# Patient Record
Sex: FEMALE
[De-identification: names, ages, dates, MRNs, and addresses within clinical notes are randomized per-mention and may not be internally consistent; named-entity substitution may affect disease eponyms.]

---

## 2018-08-14 ENCOUNTER — HOSPITAL ENCOUNTER (EMERGENCY)
Dept: HOSPITAL 56 - MW.ED | Age: 43
Discharge: HOME | End: 2018-08-14
Payer: COMMERCIAL

## 2018-08-14 DIAGNOSIS — R60.0: Primary | ICD-10-CM

## 2018-08-14 DIAGNOSIS — Z86.718: ICD-10-CM

## 2018-08-14 LAB
CHLORIDE SERPL-SCNC: 105 MMOL/L (ref 98–107)
SODIUM SERPL-SCNC: 140 MMOL/L (ref 136–145)

## 2018-08-14 NOTE — EDM.PDOC
ED HPI GENERAL MEDICAL PROBLEM





- General


Chief Complaint: Lower Extremity Injury/Pain


Stated Complaint: BOTH FEET ARE SWOLLEN


Time Seen by Provider: 08/14/18 20:38





- History of Present Illness


INITIAL COMMENTS - FREE TEXT/NARRATIVE: 


HISTORY AND PHYSICAL:





History of present illness:


Patient's 42-year-old female presented concern of lower extremity edema she 

states she's been on several long drives recently has also had one episode in 

the past after having been on her feet for a very long time she denies chest 

pain shortness of breath and history of DVT is a very active healthy woman 

otherwise. No fever chills no other complaints





Review of systems: 


As per history of present illness and below otherwise all systems reviewed and 

negative.





Past medical history: 


As per history of present illness and as reviewed below otherwise 

noncontributory.





Surgical history: 


As per history of present illness and as reviewed below otherwise 

noncontributory.





Social history: 


No reported history of drug or alcohol abuse.





Family history: 


As per history of present illness and as reviewed below otherwise 

noncontributory.





Physical exam:


HEENT: Atraumatic, normocephalic, pupils reactive, negative for conjunctival 

pallor or scleral icterus, mucous membranes moist, throat clear, neck supple, 

nontender, trachea midline.


Lungs: Clear to auscultation, breath sounds equal bilaterally, chest nontender.


Heart: S1S2, regular, negative for clicks, rubs, or JVD.


Abdomen: Soft, nondistended, nontender. Negative for masses or 

hepatosplenomegaly. Negative for costovertebral tenderness.


Pelvis: Stable nontender.


Genitourinary: Deferred.


Rectal: Deferred.


Extremities: Patient has 1+ peripheral edema of her feet and ankles 

neurovascular exams unremarkable


Neuro: Awake, alert, oriented. Cranial nerves II through XII unremarkable. 

Cerebellum unremarkable. Motor and sensory unremarkable throughout. Exam 

nonfocal.





Diagnostics:


CBC CMP chest x-ray EKG BNP UA hCG uric acid venous Doppler bilateral lower 

extremities





Therapeutics:


None





Impression: 


#1 peripheral edema etiology be determined probable dependent in nature





Definitive disposition and diagnosis as appropriate pending reevaluation and 

review of above.





  ** both ankle


Pain Score (Numeric/FACES): 4





- Related Data


 Allergies











Allergy/AdvReac Type Severity Reaction Status Date / Time


 


No Known Allergies Allergy   Verified 08/14/18 20:48











Home Meds: 


 Home Meds





Birth Control  08/14/18 [History]











Past Medical History





- Past Health History


Medical/Surgical History: Denies Medical/Surgical History





Social & Family History





- Family History


Family Medical History: Noncontributory





- Tobacco Use


Smoking Status *Q: Never Smoker





- Recreational Drug Use


Recreational Drug Use: No





Review of Systems





- Review of Systems


Review Of Systems: ROS reveals no pertinent complaints other than HPI.





ED EXAM, GENERAL





- Physical Exam


Exam: See Below (dictation)





Course





- Vital Signs


Last Recorded V/S: 





 Last Vital Signs











Temp  36.9 C   08/14/18 20:16


 


Pulse  99   08/14/18 20:16


 


Resp  18   08/14/18 20:16


 


BP  147/94 H  08/14/18 20:16


 


Pulse Ox  96   08/14/18 20:16














- Orders/Labs/Meds


Orders: 





 Active Orders 24 hr











 Category Date Time Status


 


 EKG Documentation Completion [RC] STAT Care  08/14/18 21:07 Active


 


 Chest 1V Frontal [CR] Stat Exams  08/14/18 21:09 Taken


 


 Venous Doppler Lwr Ext Bi [US] Stat Exams  08/14/18 21:08 Taken


 


 HCG QUALITATIVE,URINE [URCHEM] Stat Lab  08/14/18 21:15 Ordered


 


 UA W/MICROSCOPIC [URIN] Stat Lab  08/14/18 21:15 Ordered











Labs: 





 Laboratory Tests











  08/14/18 08/14/18 08/14/18 Range/Units





  21:15 21:15 21:15 


 


WBC     (4.0-11.0)  K/uL


 


RBC     (4.30-5.90)  M/uL


 


Hgb     (12.0-16.0)  g/dL


 


Hct     (36.0-46.0)  %


 


MCV     (80.0-98.0)  fL


 


MCH     (27.0-32.0)  pg


 


MCHC     (31.0-37.0)  g/dL


 


RDW Std Deviation     (28.0-62.0)  fl


 


RDW Coeff of Iesha     (11.0-15.0)  %


 


Plt Count     (150-400)  K/uL


 


MPV     (7.40-12.00)  fL


 


Neut % (Auto)     (48.0-80.0)  %


 


Lymph % (Auto)     (16.0-40.0)  %


 


Mono % (Auto)     (0.0-15.0)  %


 


Eos % (Auto)     (0.0-7.0)  %


 


Baso % (Auto)     (0.0-1.5)  %


 


Neut # (Auto)     (1.4-5.7)  K/uL


 


Lymph # (Auto)     (0.6-2.4)  K/uL


 


Mono # (Auto)     (0.0-0.8)  K/uL


 


Eos # (Auto)     (0.0-0.7)  K/uL


 


Baso # (Auto)     (0.0-0.1)  K/uL


 


Nucleated RBC %     /100WBC


 


Nucleated RBCs #     K/uL


 


Sodium     (136-145)  mmol/L


 


Potassium     (3.5-5.1)  mmol/L


 


Chloride     ()  mmol/L


 


Carbon Dioxide     (21.0-32.0)  mmol/L


 


BUN     (7.0-18.0)  mg/dL


 


Creatinine     (0.6-1.0)  mg/dL


 


Est Cr Clr Drug Dosing     mL/min


 


Estimated GFR (MDRD)     ml/min


 


Glucose     ()  mg/dL


 


Uric Acid   3.4   (2.6-7.2)  mg/dL


 


Calcium     (8.5-10.1)  mg/dL


 


Total Bilirubin     (0.2-1.0)  mg/dL


 


AST     (15-37)  IU/L


 


ALT     (14-63)  IU/L


 


Alkaline Phosphatase     ()  U/L


 


B-Natriuretic Peptide     (<100)  PG/ML


 


Total Protein     (6.4-8.2)  g/dL


 


Albumin     (3.4-5.0)  g/dL


 


Globulin     (2.0-3.5)  g/dL


 


Albumin/Globulin Ratio     (1.3-2.8)  


 


Urine Color  YELLOW    


 


Urine Appearance  CLEAR    


 


Urine pH  5.5    (5.0-8.0)  


 


Ur Specific Gravity  1.020    (1.001-1.035)  


 


Urine Protein  NEGATIVE    (NEGATIVE)  mg/dL


 


Urine Glucose (UA)  NEGATIVE    (NEGATIVE)  mg/dL


 


Urine Ketones  NEGATIVE    (NEGATIVE)  mg/dL


 


Urine Occult Blood  NEGATIVE    (NEGATIVE)  


 


Urine Nitrite  NEGATIVE    (NEGATIVE)  


 


Urine Bilirubin  NEGATIVE    (NEGATIVE)  


 


Urine Urobilinogen  0.2    (<2.0)  EU/dL


 


Ur Leukocyte Esterase  NEGATIVE    (NEGATIVE)  


 


Urine RBC  0-1    (0-2/HPF)  


 


Urine WBC  0-1    (0-5/HPF)  


 


Ur Epithelial Cells  MODERATE    (NONE-FEW)  


 


Urine Bacteria  RARE    (NEGATIVE)  


 


Urine HCG, Qual    NEGATIVE  (NEGATIVE)  














  08/14/18 08/14/18 08/14/18 Range/Units





  21:25 21:25 21:25 


 


WBC  6.08    (4.0-11.0)  K/uL


 


RBC  4.55    (4.30-5.90)  M/uL


 


Hgb  13.1    (12.0-16.0)  g/dL


 


Hct  39.0    (36.0-46.0)  %


 


MCV  85.7    (80.0-98.0)  fL


 


MCH  28.8    (27.0-32.0)  pg


 


MCHC  33.6    (31.0-37.0)  g/dL


 


RDW Std Deviation  41.0    (28.0-62.0)  fl


 


RDW Coeff of Iesha  13    (11.0-15.0)  %


 


Plt Count  228    (150-400)  K/uL


 


MPV  8.90    (7.40-12.00)  fL


 


Neut % (Auto)  51.8    (48.0-80.0)  %


 


Lymph % (Auto)  41.6 H    (16.0-40.0)  %


 


Mono % (Auto)  5.1    (0.0-15.0)  %


 


Eos % (Auto)  1.3    (0.0-7.0)  %


 


Baso % (Auto)  0.2    (0.0-1.5)  %


 


Neut # (Auto)  3.2    (1.4-5.7)  K/uL


 


Lymph # (Auto)  2.5 H    (0.6-2.4)  K/uL


 


Mono # (Auto)  0.3    (0.0-0.8)  K/uL


 


Eos # (Auto)  0.1    (0.0-0.7)  K/uL


 


Baso # (Auto)  0.0    (0.0-0.1)  K/uL


 


Nucleated RBC %  0.0    /100WBC


 


Nucleated RBCs #  0    K/uL


 


Sodium   140   (136-145)  mmol/L


 


Potassium   3.7   (3.5-5.1)  mmol/L


 


Chloride   105   ()  mmol/L


 


Carbon Dioxide   25.7   (21.0-32.0)  mmol/L


 


BUN   8   (7.0-18.0)  mg/dL


 


Creatinine   0.8   (0.6-1.0)  mg/dL


 


Est Cr Clr Drug Dosing   100.30   mL/min


 


Estimated GFR (MDRD)   > 60.0   ml/min


 


Glucose   163 H   ()  mg/dL


 


Uric Acid     (2.6-7.2)  mg/dL


 


Calcium   8.7   (8.5-10.1)  mg/dL


 


Total Bilirubin   0.1 L   (0.2-1.0)  mg/dL


 


AST   15   (15-37)  IU/L


 


ALT   25   (14-63)  IU/L


 


Alkaline Phosphatase   66   ()  U/L


 


B-Natriuretic Peptide    32  (<100)  PG/ML


 


Total Protein   7.5   (6.4-8.2)  g/dL


 


Albumin   3.0 L   (3.4-5.0)  g/dL


 


Globulin   4.5 H   (2.0-3.5)  g/dL


 


Albumin/Globulin Ratio   0.7 L   (1.3-2.8)  


 


Urine Color     


 


Urine Appearance     


 


Urine pH     (5.0-8.0)  


 


Ur Specific Gravity     (1.001-1.035)  


 


Urine Protein     (NEGATIVE)  mg/dL


 


Urine Glucose (UA)     (NEGATIVE)  mg/dL


 


Urine Ketones     (NEGATIVE)  mg/dL


 


Urine Occult Blood     (NEGATIVE)  


 


Urine Nitrite     (NEGATIVE)  


 


Urine Bilirubin     (NEGATIVE)  


 


Urine Urobilinogen     (<2.0)  EU/dL


 


Ur Leukocyte Esterase     (NEGATIVE)  


 


Urine RBC     (0-2/HPF)  


 


Urine WBC     (0-5/HPF)  


 


Ur Epithelial Cells     (NONE-FEW)  


 


Urine Bacteria     (NEGATIVE)  


 


Urine HCG, Qual     (NEGATIVE)  














Departure





- Departure


Time of Disposition: 22:41


Disposition: Home, Self-Care 01


Condition: Good


Clinical Impression: 


 Peripheral edema








- Discharge Information


*PRESCRIPTION DRUG MONITORING PROGRAM REVIEWED*: Not Applicable


*COPY OF PRESCRIPTION DRUG MONITORING REPORT IN PATIENT ROBIN: Not Applicable


Referrals: 


PCP,None [Primary Care Provider] - 


Additional Instructions: 


The following information is given to patients seen in the emergency department 

who are being discharged to home. This information is to outline your options 

for follow-up care. We provide all patients seen in our emergency department 

with a follow-up referral.





The need for follow-up, as well as the timing and circumstances, are variable 

depending upon the specifics of your emergency department visit.





If you don't have a primary care physician on staff, we will provide you with a 

referral. We always advise you to contact your personal physician following an 

emergency department visit to inform them of the circumstance of the visit and 

for follow-up with them and/or the need for any referrals to a consulting 

specialist.





The emergency department will also refer you to a specialist when appropriate. 

This referral assures that you have the opportunity for followup care with a 

specialist. All of these measure are taken in an effort to provide you with 

optimal care, which includes your followup.





Under all circumstances we always encourage you to contact your private 

physician who remains a resource for coordinating  your care. When calling for 

followup care, please make the office aware that this follow-up is from your 

recent emergency room visit. If for any reason you are refused follow-up, 

please contact the [facility] emergency department at [phone] 

and asked to speak to the emergency department charge nurse.




















Elevation Motrin/Tylenol as directed keep scheduled appointment tomorrow as 

discussed return as needed as discussed





- My Orders


Last 24 Hours: 





My Active Orders





08/14/18 21:07


EKG Documentation Completion [RC] STAT 





08/14/18 21:08


Venous Doppler Lwr Ext Bi [US] Stat 





08/14/18 21:09


Chest 1V Frontal [CR] Stat 





08/14/18 21:15


HCG QUALITATIVE,URINE [URCHEM] Stat 


UA W/MICROSCOPIC [URIN] Stat 














- Assessment/Plan


Last 24 Hours: 





My Active Orders





08/14/18 21:07


EKG Documentation Completion [RC] STAT 





08/14/18 21:08


Venous Doppler Lwr Ext Bi [US] Stat 





08/14/18 21:09


Chest 1V Frontal [CR] Stat 





08/14/18 21:15


HCG QUALITATIVE,URINE [URCHEM] Stat 


UA W/MICROSCOPIC [URIN] Stat

## 2018-08-15 NOTE — CR
EXAM DATE: 18



PATIENT'S AGE: 42





Patient: FIDE FREITAS



Facility: [place], [place]

Patient ID: [id number]

Site Patient ID: [id number].

Site Accession #: [id number].

: 1975

Study: XRay Chest [id number]/14/2018 10:01:06 PM

Ordering Physician: Maude Li



Final Report: 

INDICATION: Chest pain, shortness of breath



TECHNIQUE: Chest radiograph 1 view



COMPARISON: None



FINDINGS: 



Mediastinum: Symmetric prominence of the tyrell noted bilaterally. The heart 
silhouette is normal in size and morphology. 



Lung: Both lungs are unremarkable in appearance. No sign of pleural effusion 
seen. No pneumothorax is identified. 



Musculoskeletal: Unremarkable for age. 



IMPRESSION: 

1. Symmetric prominence of the tyrell noted bilaterally. Assessment with chest CT 
may be helpful to distinguish between adenopathy or vascular enlargement.



Dictated by Lj Art MD @ 2018 10:08:05 PM







Dictated by: Lj Art MD @ 2018 22:08:20

(Electronic Signature)





Report Signed by Proxy.
[facility]

## 2018-08-15 NOTE — US
EXAM DATE: 18



PATIENT'S AGE: 42





Patient: FIDE FREITAS



Facility: [place], [place]

Patient ID: [id number]

Site Patient ID: [id number].

Site Accession #: [id number]

: 1975

Study: US Extremity Bilateral [id number]/14/2018 10:17:41 PM

Ordering Physician: Maude Li



Final Report: 

INDICATION: bilateral leg swelling 1 week



BILATERAL LOWER EXTREMITY VENOUS DUPLEX ULTRASOUND



TECHNIQUE: Duplex sonography using grayscale imaging as well as color and 
spectral Doppler interrogation was performed over both lower extremities with 
attention to the deep venous system.



FINDINGS: The common femoral, femoral, deep femoral, popliteal, and posterior 
tibial veins show normal compressibility, color Doppler flow, and augmentation 
response bilaterally.



IMPRESSION: No evidence of deep venous thrombosis in either lower extremity. 



BARRIE KRAFT MD

[facility]







Dictated by: Russel Kraft MD @ 2018 22:19:10

(Electronic Signature)





Report Signed by Proxy.
VÍCTOR